# Patient Record
Sex: MALE | Race: WHITE | NOT HISPANIC OR LATINO | Employment: STUDENT | URBAN - METROPOLITAN AREA
[De-identification: names, ages, dates, MRNs, and addresses within clinical notes are randomized per-mention and may not be internally consistent; named-entity substitution may affect disease eponyms.]

---

## 2024-04-25 ENCOUNTER — OFFICE VISIT (OUTPATIENT)
Dept: PEDIATRICS | Facility: CLINIC | Age: 19
End: 2024-04-25
Payer: COMMERCIAL

## 2024-04-25 VITALS — TEMPERATURE: 98.7 F | WEIGHT: 200 LBS

## 2024-04-25 DIAGNOSIS — L03.011 PARONYCHIA OF RIGHT INDEX FINGER: Primary | ICD-10-CM

## 2024-04-25 PROCEDURE — 87205 SMEAR GRAM STAIN: CPT

## 2024-04-25 PROCEDURE — 99213 OFFICE O/P EST LOW 20 MIN: CPT | Performed by: PEDIATRICS

## 2024-04-25 PROCEDURE — 87070 CULTURE OTHR SPECIMN AEROBIC: CPT

## 2024-04-25 PROCEDURE — 87075 CULTR BACTERIA EXCEPT BLOOD: CPT

## 2024-04-25 RX ORDER — MUPIROCIN 20 MG/G
OINTMENT TOPICAL 3 TIMES DAILY
Qty: 22 G | Refills: 0 | Status: SHIPPED | OUTPATIENT
Start: 2024-04-25 | End: 2024-05-05

## 2024-04-25 RX ORDER — CEPHALEXIN 500 MG/1
500 CAPSULE ORAL 3 TIMES DAILY
Qty: 30 CAPSULE | Refills: 0 | Status: SHIPPED | OUTPATIENT
Start: 2024-04-25 | End: 2024-05-05

## 2024-04-25 NOTE — PROGRESS NOTES
Subjective   Chief Complaint: Hand Pain (Right pointer finger infection ).  Hand Pain       Karan is a 18 y.o. male who presents for Hand Pain (Right pointer finger infection ), who is accompanied by his mother.  There is pain, redness and swelling.  There is no known history of trauma.  He has not had a cold sore as far as he knows.  There has not been a fever.        Review of Systems    Objective     Temp 37.1 °C (98.7 °F)   Wt 90.7 kg (200 lb)     Physical Exam  Vitals and nursing note reviewed. Exam conducted with a chaperone present.   Constitutional:       General: He is not in acute distress.  Cardiovascular:      Rate and Rhythm: Normal rate.   Pulmonary:      Effort: Pulmonary effort is normal.   Musculoskeletal:         General: Swelling and tenderness present. Normal range of motion.      Comments: Right distal aspect of index finger swollen and TTP.  There appears to be some fluctuance at tip.      Culture obtained and sent.     Neurological:      Mental Status: He is alert.         Assessment/Plan   Problem List Items Addressed This Visit       Paronychia of right index finger - Primary    Relevant Medications    mupirocin (Bactroban) 2 % ointment    cephalexin (Keflex) 500 mg capsule    Other Relevant Orders    Tissue/Wound Culture/Smear (Completed)

## 2024-04-25 NOTE — PATIENT INSTRUCTIONS
Soak with Epsom salts or peroxide    Take antibiotic as directed for the next 10 days.    Apply mupirocin ointment to the affected area(s) three times a day for 7 days.      Encourage rest and fluids.    Tylenol and ibuprofen as needed.    Will call with culture results which may require an antibiotic change.

## 2024-04-28 LAB
BACTERIA SPEC CULT: NORMAL
GRAM STN SPEC: NORMAL
GRAM STN SPEC: NORMAL

## 2024-04-29 DIAGNOSIS — B00.89 HERPETIC WHITLOW: Primary | ICD-10-CM

## 2024-04-29 RX ORDER — VALACYCLOVIR HYDROCHLORIDE 1 G/1
1000 TABLET, FILM COATED ORAL 2 TIMES DAILY
Qty: 14 TABLET | Refills: 0 | Status: SHIPPED | OUTPATIENT
Start: 2024-04-29 | End: 2024-05-06